# Patient Record
Sex: MALE | Race: BLACK OR AFRICAN AMERICAN | NOT HISPANIC OR LATINO | Employment: OTHER | URBAN - METROPOLITAN AREA
[De-identification: names, ages, dates, MRNs, and addresses within clinical notes are randomized per-mention and may not be internally consistent; named-entity substitution may affect disease eponyms.]

---

## 2019-05-23 ENCOUNTER — OFFICE VISIT (OUTPATIENT)
Dept: SURGERY | Facility: CLINIC | Age: 64
End: 2019-05-23
Payer: COMMERCIAL

## 2019-05-23 VITALS — BODY MASS INDEX: 28.81 KG/M2 | HEIGHT: 69 IN | WEIGHT: 194.5 LBS

## 2019-05-23 DIAGNOSIS — N63.20 BREAST MASS, LEFT: Primary | ICD-10-CM

## 2019-05-23 PROCEDURE — 99203 PR OFFICE/OUTPT VISIT, NEW, LEVL III, 30-44 MIN: ICD-10-PCS | Mod: S$GLB,,, | Performed by: SURGERY

## 2019-05-23 PROCEDURE — 3008F PR BODY MASS INDEX (BMI) DOCUMENTED: ICD-10-PCS | Mod: CPTII,S$GLB,, | Performed by: SURGERY

## 2019-05-23 PROCEDURE — 99203 OFFICE O/P NEW LOW 30 MIN: CPT | Mod: S$GLB,,, | Performed by: SURGERY

## 2019-05-23 PROCEDURE — 3008F BODY MASS INDEX DOCD: CPT | Mod: CPTII,S$GLB,, | Performed by: SURGERY

## 2019-05-23 RX ORDER — LISINOPRIL AND HYDROCHLOROTHIAZIDE 10; 12.5 MG/1; MG/1
TABLET ORAL
COMMUNITY
Start: 2019-05-03 | End: 2021-02-08

## 2019-05-23 NOTE — LETTER
May 23, 2019      Jamison Chavez MD  [address]  [address]  [place] [address]           [place] [facility]  [address]  [place] [address]  Phone: [phone]  Fax: [phone]          Patient: Tulio Farnz   MR Number: [id number]   YOB: 1955   Date of Visit: 5/23/2019       Dear Dr. Jamison Chavez:    Thank you for referring Tulio Franz to me for evaluation. Attached you will find relevant portions of my assessment and plan of care.    If you have questions, please do not hesitate to call me. I look forward to following Tulio Franz along with you.    Sincerely,    Henrry Woodson MD    Enclosure  CC:  No Recipients    If you would like to receive this communication electronically, please contact [email or web address] or [phone] to request more information on [employer] Link access.    For providers and/or their staff who would like to refer a patient to [facility], please contact us through our one-stop-shop provider referral line, [facility], at [phone].    If you feel you have received this communication in error or would no longer like to receive these types of communications, please e-mail [email or web address]

## 2019-06-25 ENCOUNTER — OFFICE VISIT (OUTPATIENT)
Dept: SURGERY | Facility: CLINIC | Age: 64
End: 2019-06-25
Payer: COMMERCIAL

## 2019-06-25 VITALS — BODY MASS INDEX: 28.65 KG/M2 | WEIGHT: 194 LBS

## 2019-06-25 DIAGNOSIS — N63.20 BREAST MASS, LEFT: Primary | ICD-10-CM

## 2019-06-25 LAB
BASOPHILS NFR SNV MANUAL: 0.6 % (ref 0–3)
BUN SERPL-MCNC: 16 MG/DL (ref 7–18)
BUN/CREAT SERPL: 17.97 RATIO (ref 7–18)
CALCIUM SERPL-MCNC: 8.9 MG/DL (ref 8.8–10.5)
CHLORIDE SERPL-SCNC: 104 MMOL/L (ref 100–108)
CO2 SERPL-SCNC: 31 MMOL/L (ref 21–32)
CREAT SERPL-MCNC: 0.89 MG/DL (ref 0.7–1.3)
EOSINOPHIL NFR SNV MANUAL: 4.2 % (ref 1–3)
ERYTHROCYTE [DISTWIDTH] IN BLOOD BY AUTOMATED COUNT: 12.9 % (ref 12.5–18)
GFR ESTIMATION: > 60
GLUCOSE SERPL-MCNC: 83 MG/DL (ref 70–110)
HCT VFR BLD AUTO: 41.1 % (ref 42–52)
HGB BLD-MCNC: 14.5 G/DL (ref 14–18)
LYMPHOCYTES NFR SNV MANUAL: 37.1 % (ref 25–40)
MANUAL NRBC PER 100 CELLS: 0 %
MCH RBC QN AUTO: 29.1 PG (ref 27–31.2)
MCHC RBC AUTO-ENTMCNC: 35.3 G/DL (ref 31.8–35.4)
MCV RBC AUTO: 82.5 FL (ref 80–97)
MONOCYTES/100 LEUKOCYTES: 13.4 % (ref 1–15)
NEUTROPHILS NFR BLD: 2.23 10*3/UL (ref 1.8–7.7)
NEUTROPHILS NFR SNV MANUAL: 44.5 % (ref 37–80)
PLATELETS: 146 10*3/UL (ref 142–424)
POTASSIUM SERPL-SCNC: 4 MMOL/L (ref 3.6–5.2)
RBC # BLD AUTO: 4.98 10*6/UL (ref 4.7–6.1)
SODIUM BLD-SCNC: 143 MMOL/L (ref 135–145)
WBC # BLD: 5 10*3/UL (ref 4.6–10.2)

## 2019-06-25 PROCEDURE — 99212 OFFICE O/P EST SF 10 MIN: CPT | Mod: S$GLB,,, | Performed by: SURGERY

## 2019-06-25 PROCEDURE — 3008F PR BODY MASS INDEX (BMI) DOCUMENTED: ICD-10-PCS | Mod: CPTII,S$GLB,, | Performed by: SURGERY

## 2019-06-25 PROCEDURE — 99212 PR OFFICE/OUTPT VISIT, EST, LEVL II, 10-19 MIN: ICD-10-PCS | Mod: S$GLB,,, | Performed by: SURGERY

## 2019-06-25 PROCEDURE — 3008F BODY MASS INDEX DOCD: CPT | Mod: CPTII,S$GLB,, | Performed by: SURGERY

## 2019-06-25 NOTE — PROGRESS NOTES
Subjective:       Patient ID: Tulio Franz is a 63 y.o. male.    Chief Complaint: Follow-up    HPI: After his last visit Mr. Franz changes mind about having the mass excised from the left breast.  However he was seen in Dr. Chavez's office recently and was strongly encouraged to proceed with the biopsy.  He comes in today to discuss further and to schedule.    Objective:      Physical Exam:  The mass in the left breast is unchanged.  It is located at 9 o'clock zone 1 position and is mobile, nontender, and well-demarcated.  No axillary adenopathy.    Assessment:       Left breast mass, classified as BI-RADS 4 by imaging  Plan:       Excisional biopsy is scheduled for Monday 07/01/2019.

## 2019-06-25 NOTE — LETTER
June 25, 2019      Jamison Chavez MD  [address]  [address]  [place] [address]           [place] [facility]  [address]  [place] [address]  Phone: [phone]  Fax: [phone]          Patient: Tulio Franz   MR Number: [id number]   YOB: 1955   Date of Visit: 6/25/2019       Dear Dr. Jamison Chavez:    Thank you for referring Tulio Franz to me for evaluation. Attached you will find relevant portions of my assessment and plan of care.    If you have questions, please do not hesitate to call me. I look forward to following Tulio Franz along with you.    Sincerely,    Henrry Woodson MD    Enclosure  CC:  No Recipients    If you would like to receive this communication electronically, please contact [email or web address] or [phone] to request more information on [employer] Link access.    For providers and/or their staff who would like to refer a patient to [facility], please contact us through our one-stop-shop provider referral line, [facility], at [phone].    If you feel you have received this communication in error or would no longer like to receive these types of communications, please e-mail [email or web address]

## 2019-07-01 ENCOUNTER — OUTSIDE PLACE OF SERVICE (OUTPATIENT)
Dept: ADMINISTRATIVE | Facility: OTHER | Age: 64
End: 2019-07-01
Payer: COMMERCIAL

## 2019-07-01 PROCEDURE — 19120 PR EXCISE BREAST CYST: ICD-10-PCS | Mod: LT,,, | Performed by: SURGERY

## 2019-07-01 PROCEDURE — 19120 REMOVAL OF BREAST LESION: CPT | Mod: LT,,, | Performed by: SURGERY

## 2019-07-09 ENCOUNTER — OFFICE VISIT (OUTPATIENT)
Dept: SURGERY | Facility: CLINIC | Age: 64
End: 2019-07-09
Payer: COMMERCIAL

## 2019-07-09 DIAGNOSIS — N63.20 BREAST MASS, LEFT: Primary | ICD-10-CM

## 2019-07-09 PROCEDURE — 99024 PR POST-OP FOLLOW-UP VISIT: ICD-10-PCS | Mod: S$GLB,,, | Performed by: SURGERY

## 2019-07-09 PROCEDURE — 99024 POSTOP FOLLOW-UP VISIT: CPT | Mod: S$GLB,,, | Performed by: SURGERY

## 2019-07-09 NOTE — PROGRESS NOTES
Subjective:       Patient ID: Tulio Franz is a 63 y.o. male.    Chief Complaint: Post-op Evaluation    HPI:  Mr. Franz is postop excision of a left breast mass.  The lesion was partially cystic at the time of excision.  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  He has no complaints today.    Review of Systems    Objective:      Physical Exam:  Incision clean and healing.  Sutures removed.  Assessment:       Left breast mass, benign  Plan:       Resume all usual activities.  Return as needed.

## 2020-07-23 DIAGNOSIS — R97.20 ELEVATED PSA: Primary | ICD-10-CM

## 2020-08-03 ENCOUNTER — OFFICE VISIT (OUTPATIENT)
Dept: UROLOGY | Facility: CLINIC | Age: 65
End: 2020-08-03
Payer: MEDICARE

## 2020-08-03 VITALS
BODY MASS INDEX: 28.73 KG/M2 | RESPIRATION RATE: 18 BRPM | WEIGHT: 194 LBS | HEART RATE: 84 BPM | HEIGHT: 69 IN | DIASTOLIC BLOOD PRESSURE: 86 MMHG | SYSTOLIC BLOOD PRESSURE: 153 MMHG

## 2020-08-03 DIAGNOSIS — R97.20 ELEVATED PSA: Primary | ICD-10-CM

## 2020-08-03 PROCEDURE — 99204 OFFICE O/P NEW MOD 45 MIN: CPT | Mod: S$GLB,,, | Performed by: SPECIALIST

## 2020-08-03 PROCEDURE — 99204 PR OFFICE/OUTPT VISIT, NEW, LEVL IV, 45-59 MIN: ICD-10-PCS | Mod: S$GLB,,, | Performed by: SPECIALIST

## 2020-08-03 NOTE — PROGRESS NOTES
Subjective:       Patient ID: Tulio Franz is a 64 y.o. male.    Chief Complaint: Other (Elevated PSA per Dr. Chavez)      HPI:  64-year-old man referred to us for management of elevated PSA.  He had a routine PSA obtained 2019 and was 3.43 ng/mL.  He had a repeat PSA this year on 2020 and was 6.49 ng/mL.  He does have a family history of prostate cancer based father was diagnosed with prostate cancer at age 75.  Father  in  of causes on related to prostate cancer.  He is able to void well denies any bothersome lower urinary tract symptoms.  He is not taking any alpha blockers.    Patient sexually active and has good erections.  He also bikes 5 times a week, walks 2 miles a day, and does a lot of renal vision that involves him going up and down ladder is.  All this is establish that patient might be very active and the elevation in the PSA might be a spurious occurrence.    Past Medical History:   Past Medical History:   Diagnosis Date    Hypertension        Past Surgical Historical:   Past Surgical History:   Procedure Laterality Date    TONSILLECTOMY          Medications:   Medication List with Changes/Refills   Current Medications    LISINOPRIL-HYDROCHLOROTHIAZIDE (PRINZIDE,ZESTORETIC) 10-12.5 MG PER TABLET            Past Social History:   Social History     Socioeconomic History    Marital status:      Spouse name: Not on file    Number of children: Not on file    Years of education: Not on file    Highest education level: Not on file   Occupational History    Not on file   Social Needs    Financial resource strain: Not on file    Food insecurity     Worry: Not on file     Inability: Not on file    Transportation needs     Medical: Not on file     Non-medical: Not on file   Tobacco Use    Smoking status: Never Smoker   Substance and Sexual Activity    Alcohol use: Yes     Frequency: Monthly or less    Drug use: Not on file    Sexual activity: Not on file    Lifestyle    Physical activity     Days per week: Not on file     Minutes per session: Not on file    Stress: Not on file   Relationships    Social connections     Talks on phone: Not on file     Gets together: Not on file     Attends Jewish service: Not on file     Active member of club or organization: Not on file     Attends meetings of clubs or organizations: Not on file     Relationship status: Not on file   Other Topics Concern    Not on file   Social History Narrative    Not on file       Allergies: Review of patient's allergies indicates:  No Known Allergies     Family History:   Family History   Problem Relation Age of Onset    Hypertension Mother     Heart disease Father         Review of Systems:  Review of Systems - General ROS: negative  Psychological ROS: negative  Ophthalmic ROS: negative  ENT ROS: negative  Allergy and Immunology ROS: negative  Hematological and Lymphatic ROS: negative  Endocrine ROS: negative  Respiratory ROS: no cough, shortness of breath, or wheezing  Cardiovascular ROS: no chest pain or dyspnea on exertion  Gastrointestinal ROS: no abdominal pain, change in bowel habits, or black or bloody stools  Genito-Urinary ROS: positive for - elevated PSA  Musculoskeletal ROS: negative  Neurological ROS: no TIA or stroke symptoms  Dermatological ROS: negative     Physical Exam:  General Appearance:    Alert, cooperative, no distress, appears stated age   Head:    Normocephalic, without obvious abnormality, atraumatic   Eyes:    PERRL, conjunctiva/corneas clear, EOM's intact, fundi     benign, both eyes        Ears:    Normal TM's and external ear canals, both ears   Nose:   Nares normal, septum midline, mucosa normal, no drainage    or sinus tenderness   Throat:   Lips, mucosa, and tongue normal; teeth and gums normal   Neck:   Supple, symmetrical, trachea midline, no adenopathy;        thyroid:  No enlargement/tenderness/nodules; no carotid    bruit or JVD   Back:      Symmetric, no curvature, ROM normal, no CVA tenderness   Lungs:     Clear to auscultation bilaterally, respirations unlabored   Chest wall:    No tenderness or deformity   Heart:    Regular rate and rhythm, S1 and S2 normal, no murmur, rub   or gallop   Abdomen:     Soft, non-tender, bowel sounds active all four quadrants,     no masses, no organomegaly   Genitalia:    Normal male without lesion, discharge or tenderness, meatus is adequate and in the right location, phallus is normal he is not circumcised.  Scrotum is normal testicular size is normal.   Rectal:    Normal tone, normal prostate, no masses or tenderness;    Prostate is smooth, soft to palpation, no tenderness, finger estimation at least 30 g.   Extremities:   Extremities normal, atraumatic, no cyanosis or edema   Pulses:   2+ and symmetric all extremities   Skin:   Skin color, texture, turgor normal, no rashes or lesions   Lymph nodes:   Cervical, supraclavicular, and axillary nodes normal   Neurologic:   CNII-XII intact. Normal strength, sensation and reflexes       throughout         Assessment/Plan:       64-year-old man who is referred to us for elevated PSA.    1.  Should repeat a PSA today free and total give him a call with the results  2.  I make a follow-up appointment for him for 6 months but based on the PSA results today we will modify the plan.    Problem List Items Addressed This Visit     None      Visit Diagnoses     Elevated PSA    -  Primary    Relevant Orders    PSA, total and free

## 2020-08-07 LAB
PROSTATE SPECIFIC ANTIGEN, TOTAL: 4.8 NG/ML
PSA FREE MFR SERPL: 17 % (CALC)
PSA FREE SERPL-MCNC: 0.8 NG/ML

## 2020-08-10 ENCOUNTER — TELEPHONE (OUTPATIENT)
Dept: UROLOGY | Facility: CLINIC | Age: 65
End: 2020-08-10

## 2020-08-10 NOTE — TELEPHONE ENCOUNTER
----- Message from Alyssia Mayfield NP sent at 8/10/2020  8:44 AM CDT -----  Please call patient and let him know PSA levels and that we are recommending TRUS bx

## 2020-08-10 NOTE — TELEPHONE ENCOUNTER
Attempted to contact pt. Voicemail not setup. Unable to leave message.     ABW      ----- Message from Alyssia Mayfield NP sent at 8/10/2020  8:44 AM CDT -----  Please call patient and let him know PSA levels and that we are recommending TRUS bx

## 2020-08-10 NOTE — TELEPHONE ENCOUNTER
Attempted to contact pt. No voicemail setup to leave message.    ABW    ----- Message from Alyssia Mayfield NP sent at 8/10/2020  8:44 AM CDT -----  Please call patient and let him know PSA levels and that we are recommending TRUS bx

## 2020-08-11 ENCOUNTER — TELEPHONE (OUTPATIENT)
Dept: UROLOGY | Facility: CLINIC | Age: 65
End: 2020-08-11

## 2020-08-11 NOTE — TELEPHONE ENCOUNTER
Contacted pt. Adv pt PSA level is 4.8 & Dr Cano is recommending TRUS bx. Pt would like speak to Dr Cano in person. Scheduled pt for 8/13 for consultation. Verbalized understanding.    ABW

## 2020-08-11 NOTE — TELEPHONE ENCOUNTER
Attempted to contact pt's emergency contact. Left voicemail, adv'ng to have pt call us back.    ABW

## 2020-08-11 NOTE — TELEPHONE ENCOUNTER
Contacted pt. Adv pt PSA level is 4.8 & Dr Cano is recommending TRUS bx. Pt would like speak to Dr Cano in person. Scheduled pt for 8/13 for consultation. Verbalized understanding.    ABW        ----- Message from Ila White sent at 8/11/2020  3:13 PM CDT -----  Contact: Pt  Type:  Patient Returning Call    Who Called: pt   Who Left Message for Patient:nurse   Does the patient know what this is regarding?:unknown   Would the patient rather a call back or a response via [facility]? Phone   Best Call Back Number: [phone]   Additional Information:

## 2020-08-13 ENCOUNTER — OFFICE VISIT (OUTPATIENT)
Dept: UROLOGY | Facility: CLINIC | Age: 65
End: 2020-08-13
Payer: MEDICARE

## 2020-08-13 VITALS
HEIGHT: 69 IN | WEIGHT: 194 LBS | HEART RATE: 62 BPM | BODY MASS INDEX: 28.73 KG/M2 | DIASTOLIC BLOOD PRESSURE: 89 MMHG | RESPIRATION RATE: 18 BRPM | SYSTOLIC BLOOD PRESSURE: 142 MMHG

## 2020-08-13 DIAGNOSIS — R97.20 ELEVATED PSA: Primary | ICD-10-CM

## 2020-08-13 DIAGNOSIS — N41.9 PROSTATITIS, UNSPECIFIED PROSTATITIS TYPE: ICD-10-CM

## 2020-08-13 PROCEDURE — 99213 PR OFFICE/OUTPT VISIT, EST, LEVL III, 20-29 MIN: ICD-10-PCS | Mod: S$GLB,,, | Performed by: SPECIALIST

## 2020-08-13 PROCEDURE — 99213 OFFICE O/P EST LOW 20 MIN: CPT | Mod: S$GLB,,, | Performed by: SPECIALIST

## 2020-08-13 RX ORDER — SULFAMETHOXAZOLE AND TRIMETHOPRIM 800; 160 MG/1; MG/1
1 TABLET ORAL 2 TIMES DAILY
Qty: 42 TABLET | Refills: 0 | Status: SHIPPED | OUTPATIENT
Start: 2020-08-13 | End: 2021-02-08

## 2020-08-13 RX ORDER — TAMSULOSIN HYDROCHLORIDE 0.4 MG/1
0.4 CAPSULE ORAL DAILY
Qty: 30 CAPSULE | Refills: 5 | Status: SHIPPED | OUTPATIENT
Start: 2020-08-13 | End: 2021-02-08

## 2020-08-13 NOTE — PROGRESS NOTES
Subjective:       Patient ID: Tulio Franz is a 64 y.o. male.    Chief Complaint: Other (Discuss biopsy needed)      HPI:  64-year-old man with a family history of prostate cancer who saw me in clinic 08/03/2020 for prostate check.  We examined his prostate here in the clinic and we also checked a PSA.  His PSA came back at 4.8 ng/mL with a 17% free.  He is  he has a family history of prostate cancer with elevated PSA we recommended a biopsy.    Patient is here today reporting that since the day we did a GUALBERTO has been feeling weird he feels like he is sitting on a ball he feels perineal discomfort.  Urination is also becoming a little difficult.  He also had had unprotected intercourse the Sunday prior to seeing me and he was worried if that could be related.  Patient denies any urethral discharge, denies any dysuria.  Denies any rash on the penis.    Past Medical History:   Past Medical History:   Diagnosis Date    Hypertension        Past Surgical Historical:   Past Surgical History:   Procedure Laterality Date    TONSILLECTOMY          Medications:   Medication List with Changes/Refills   New Medications    SULFAMETHOXAZOLE-TRIMETHOPRIM 800-160MG (BACTRIM DS) 800-160 MG TAB    Take 1 tablet by mouth 2 (two) times daily.    TAMSULOSIN (FLOMAX) 0.4 MG CAP    Take 1 capsule (0.4 mg total) by mouth once daily.   Current Medications    LISINOPRIL-HYDROCHLOROTHIAZIDE (PRINZIDE,ZESTORETIC) 10-12.5 MG PER TABLET            Past Social History:   Social History     Socioeconomic History    Marital status:      Spouse name: Not on file    Number of children: Not on file    Years of education: Not on file    Highest education level: Not on file   Occupational History    Not on file   Social Needs    Financial resource strain: Not on file    Food insecurity     Worry: Not on file     Inability: Not on file    Transportation needs     Medical: Not on file     Non-medical: Not on file    Tobacco Use    Smoking status: Never Smoker   Substance and Sexual Activity    Alcohol use: Yes     Frequency: Monthly or less    Drug use: Not on file    Sexual activity: Not on file   Lifestyle    Physical activity     Days per week: Not on file     Minutes per session: Not on file    Stress: Not on file   Relationships    Social connections     Talks on phone: Not on file     Gets together: Not on file     Attends Orthodox service: Not on file     Active member of club or organization: Not on file     Attends meetings of clubs or organizations: Not on file     Relationship status: Not on file   Other Topics Concern    Not on file   Social History Narrative    Not on file       Allergies: Review of patient's allergies indicates:  No Known Allergies     Family History:   Family History   Problem Relation Age of Onset    Hypertension Mother     Heart disease Father         Review of Systems:   systems reviewed and notable for prostatitis in the presence of elevated PSA  All other systems were reviewed Neg except as stated in the HPI    Physical Exam:  General: A&Ox3. No apparent distress. No deformities.  Neck: No masses. Normal thyroid.  Lungs: normal inspiration. No use of accessory muscles.  Heart: normal pulse. No arrhythmias.  Abdomen: Soft. NT. ND. No masses. No hernias. No hepatosplenomegaly.  Lymphatic: Neck and groin nodes negative.  Skin: The skin is warm and dry. No jaundice.  Neurology: Cranial nerves 2-12 crossly intact, no focal weaknesses, no sensation deficits, no motor deficits  Ext: No clubbing, cyanosis or edema.  :  Deferred      Assessment/Plan:       64-year-old man with elevated PSA was pending a biopsy.  Patient has presented with symptoms suggestive of prostatitis resulting from a digital rectal exam last week.    1.  Going to treat him accordingly with Bactrim for 21 days and Flomax for about 30 days.    2.  Return to clinic in 4 weeks after completion therapy that will  make a decision if we it will be clinically appropriate to proceed with a biopsy    Problem List Items Addressed This Visit     None      Visit Diagnoses     Elevated PSA    -  Primary    Prostatitis, unspecified prostatitis type

## 2021-02-08 ENCOUNTER — OFFICE VISIT (OUTPATIENT)
Dept: UROLOGY | Facility: CLINIC | Age: 66
End: 2021-02-08
Payer: MEDICARE

## 2021-02-08 VITALS
HEIGHT: 69 IN | BODY MASS INDEX: 30.66 KG/M2 | DIASTOLIC BLOOD PRESSURE: 96 MMHG | HEART RATE: 62 BPM | WEIGHT: 207 LBS | SYSTOLIC BLOOD PRESSURE: 162 MMHG

## 2021-02-08 DIAGNOSIS — R97.20 ELEVATED PSA: Primary | ICD-10-CM

## 2021-02-08 DIAGNOSIS — N40.1 BPH WITH OBSTRUCTION/LOWER URINARY TRACT SYMPTOMS: ICD-10-CM

## 2021-02-08 DIAGNOSIS — N13.8 BPH WITH OBSTRUCTION/LOWER URINARY TRACT SYMPTOMS: ICD-10-CM

## 2021-02-08 DIAGNOSIS — Z80.42 FAMILY HISTORY OF PROSTATE CANCER: ICD-10-CM

## 2021-02-08 DIAGNOSIS — N41.9 PROSTATITIS, UNSPECIFIED PROSTATITIS TYPE: ICD-10-CM

## 2021-02-08 LAB — POC RESIDUAL URINE VOLUME: 0 ML (ref 0–100)

## 2021-02-08 PROCEDURE — 51798 US URINE CAPACITY MEASURE: CPT | Mod: S$GLB,,, | Performed by: NURSE PRACTITIONER

## 2021-02-08 PROCEDURE — 81001 URINALYSIS AUTO W/SCOPE: CPT | Mod: S$GLB,,, | Performed by: NURSE PRACTITIONER

## 2021-02-08 PROCEDURE — 81001 PR  URINALYSIS, AUTO, W/SCOPE: ICD-10-PCS | Mod: S$GLB,,, | Performed by: NURSE PRACTITIONER

## 2021-02-08 PROCEDURE — 36415 PR COLLECTION VENOUS BLOOD,VENIPUNCTURE: ICD-10-PCS | Mod: S$GLB,,, | Performed by: NURSE PRACTITIONER

## 2021-02-08 PROCEDURE — 36415 COLL VENOUS BLD VENIPUNCTURE: CPT | Mod: S$GLB,,, | Performed by: NURSE PRACTITIONER

## 2021-02-08 PROCEDURE — 99214 OFFICE O/P EST MOD 30 MIN: CPT | Mod: 25,S$GLB,, | Performed by: NURSE PRACTITIONER

## 2021-02-08 PROCEDURE — 99214 PR OFFICE/OUTPT VISIT, EST, LEVL IV, 30-39 MIN: ICD-10-PCS | Mod: 25,S$GLB,, | Performed by: NURSE PRACTITIONER

## 2021-02-08 PROCEDURE — 51798 POCT BLADDER SCAN: ICD-10-PCS | Mod: S$GLB,,, | Performed by: NURSE PRACTITIONER

## 2021-02-08 RX ORDER — LUBIPROSTONE 24 UG/1
CAPSULE ORAL
COMMUNITY

## 2021-02-08 RX ORDER — LISINOPRIL AND HYDROCHLOROTHIAZIDE 10; 12.5 MG/1; MG/1
TABLET ORAL
COMMUNITY

## 2021-02-08 RX ORDER — TADALAFIL 20 MG/1
TABLET ORAL
COMMUNITY
End: 2021-02-08

## 2021-02-08 RX ORDER — HYDROCHLOROTHIAZIDE 25 MG/1
TABLET ORAL
COMMUNITY
End: 2021-02-08

## 2021-02-08 RX ORDER — TAMSULOSIN HYDROCHLORIDE 0.4 MG/1
0.4 CAPSULE ORAL NIGHTLY
Qty: 30 CAPSULE | Refills: 11 | Status: SHIPPED | OUTPATIENT
Start: 2021-02-08 | End: 2022-02-08

## 2021-02-08 RX ORDER — CYCLOBENZAPRINE HCL 10 MG
TABLET ORAL
COMMUNITY
End: 2021-02-08

## 2021-02-08 RX ORDER — TADALAFIL 5 MG/1
TABLET ORAL
COMMUNITY
End: 2021-02-08

## 2021-02-08 RX ORDER — DICLOFENAC SODIUM 75 MG/1
75 TABLET, DELAYED RELEASE ORAL 2 TIMES DAILY PRN
COMMUNITY
Start: 2020-11-29 | End: 2021-02-08

## 2021-02-08 RX ORDER — PANTOPRAZOLE SODIUM 40 MG/1
TABLET, DELAYED RELEASE ORAL
COMMUNITY

## 2021-02-12 LAB
PROSTATE SPECIFIC ANTIGEN, TOTAL: 2.3 NG/ML
PSA FREE MFR SERPL: 17 % (CALC)
PSA FREE SERPL-MCNC: 0.4 NG/ML

## 2021-02-17 ENCOUNTER — TELEPHONE (OUTPATIENT)
Dept: UROLOGY | Facility: CLINIC | Age: 66
End: 2021-02-17

## 2021-09-07 DIAGNOSIS — N40.0 BPH WITHOUT URINARY OBSTRUCTION: Primary | ICD-10-CM

## 2022-01-06 ENCOUNTER — TELEPHONE (OUTPATIENT)
Dept: UROLOGY | Facility: CLINIC | Age: 67
End: 2022-01-06
Payer: MEDICARE

## 2023-09-29 NOTE — PROGRESS NOTES
Subjective:       Patient ID: Tulio Franz is a 63 y.o. male.    Chief Complaint: Breast Problem    Mr. Antunez is seen for evaluation of a left breast mass.  He noticed this while showering about 1 month ago.  It is not painful and has not changed any since 1st noticed.  No family history of breast or ovarian cancer.  An ultrasound was done and shows a 1 x 0.6 cm solid mass in the left breast 9:00  zone 1 position. It was described as mildly lobulated and with indistinct borders in some areas.  This was classified as a BI-RADS 4 lesion with biopsy recommended.    Past Medical History:   Diagnosis Date    Hypertension      Past Surgical History:   Procedure Laterality Date    TONSILLECTOMY       Family History   Problem Relation Age of Onset    Hypertension Mother     Heart disease Father      Social History     Socioeconomic History    Marital status:      Spouse name: Not on file    Number of children: Not on file    Years of education: Not on file    Highest education level: Not on file   Occupational History    Not on file   Social Needs    Financial resource strain: Not on file    Food insecurity:     Worry: Not on file     Inability: Not on file    Transportation needs:     Medical: Not on file     Non-medical: Not on file   Tobacco Use    Smoking status: Never Smoker   Substance and Sexual Activity    Alcohol use: Yes     Frequency: Monthly or less    Drug use: Not on file    Sexual activity: Not on file   Lifestyle    Physical activity:     Days per week: Not on file     Minutes per session: Not on file    Stress: Not on file   Relationships    Social connections:     Talks on phone: Not on file     Gets together: Not on file     Attends Mormonism service: Not on file     Active member of club or organization: Not on file     Attends meetings of clubs or organizations: Not on file     Relationship status: Not on file   Other Topics Concern    Not on file   Social History  "Narrative    Not on file       Current Outpatient Medications   Medication Sig Dispense Refill    lisinopril-hydrochlorothiazide (PRINZIDE,ZESTORETIC) 10-12.5 mg per tablet        No current facility-administered medications for this visit.      Review of patient's allergies indicates:  No Known Allergies    Review of Systems   All other systems reviewed and are negative.      Objective:      Vitals:    05/23/19 1001   Weight: 88.2 kg (194 lb 8 oz)   Height: 5' 9" (1.753 m)     Physical Exam   Constitutional: He is oriented to person, place, and time. He appears well-developed and well-nourished.   HENT:   Head: Normocephalic and atraumatic.   Neck: Normal range of motion. Neck supple.   Cardiovascular: Normal rate, regular rhythm and normal heart sounds.   Pulmonary/Chest: Effort normal and breath sounds normal.   No left axillary adenopathy by physical exam or ultrasound.  No nipple deformity or discharge.       Abdominal: Soft. Bowel sounds are normal. He exhibits no mass.   Musculoskeletal: Normal range of motion. He exhibits no deformity.   Neurological: He is alert and oriented to person, place, and time.   Skin: Skin is warm and dry.   Psychiatric: He has a normal mood and affect. His behavior is normal.          Diagnostics Review:  Ultrasound from [facility] imaging [place] was reviewed.  The mass is wider than tall and there is the suggestion of posterior enhancement.  On one view the margins do look slightly irregular.     Assessment:       Left breast mass, classified as a BI-RADS 4 lesion  Plan:       As this is a fairly small mass just under the skin I have recommended excisional biopsy as opposed to core needle biopsy.  Patient is agreeable to that.  He will be scheduled for Monday 06/03/2019.      " 17